# Patient Record
Sex: FEMALE | Race: WHITE | Employment: OTHER | URBAN - METROPOLITAN AREA
[De-identification: names, ages, dates, MRNs, and addresses within clinical notes are randomized per-mention and may not be internally consistent; named-entity substitution may affect disease eponyms.]

---

## 2017-05-02 ENCOUNTER — HOSPITAL ENCOUNTER (EMERGENCY)
Age: 82
Discharge: HOME | End: 2017-05-02
Attending: EMERGENCY MEDICINE
Payer: MEDICARE

## 2017-05-02 VITALS
RESPIRATION RATE: 20 BRPM | WEIGHT: 180 LBS | OXYGEN SATURATION: 98 % | SYSTOLIC BLOOD PRESSURE: 190 MMHG | BODY MASS INDEX: 29.99 KG/M2 | TEMPERATURE: 98.4 F | HEIGHT: 65 IN | HEART RATE: 70 BPM | DIASTOLIC BLOOD PRESSURE: 78 MMHG

## 2017-05-02 DIAGNOSIS — K94.20 COMPLICATION OF FEEDING TUBE (HCC): Primary | ICD-10-CM

## 2017-05-02 PROCEDURE — 77030005103 HC CATH GASTMY BLN HALY -B

## 2017-05-02 PROCEDURE — 99283 EMERGENCY DEPT VISIT LOW MDM: CPT | Performed by: NURSE PRACTITIONER

## 2017-05-02 NOTE — ED NOTES
Pt and pt daughter given discharge instructions. All questions answered, verbalizes understanding.  esign unavailable, see paper chart

## 2017-05-02 NOTE — ED PROVIDER NOTES
HPI Comments: Patient presents after her PEG tube fell out. Patient states her PEG tube fell out approx 1 hour prior to arrival to ED. Patient states she did not know her PEG tube had fallen out. She states her granddaughter noticed the PEG tube lying in her lap while she was lying in her recliner. Patient states uses her PEG tube for all feedings due to swallowing causing her to have nausea and weight loss. She denies abdominal pain at this time.         Past Medical History:   Diagnosis Date    BMI 29.0-29.9,adult     BMI 29    Brain tumor (benign) [facility])     Tumor resection @ [facility] 8/2015    Chronic renal disease 7/10/2015    RN @ [facility], denies    Current use of long term anticoagulation     Heparin 2500 Units BID    GERD (gastroesophageal reflux disease)     History of anorexia nervosa     History of DVT (deep vein thrombosis)     History of skin cancer     to face    Hypertension     Hypertensive encephalopathy syndrome 1/16/2014    resolved    Long term current use of systemic steroids     Pt currently taking Hydrocortisone    Nausea & vomiting 7/10/2015    Osteoarthritis of left knee 5/15/2009    Transient ischemic attack (TIA) 1/16/2014    RN @ [facility], denies    UTI (lower urinary tract infection) 10/11/2015    RN @ [facility], denies       Past Surgical History:   Procedure Laterality Date    HX HYSTERECTOMY      HX KNEE REPLACEMENT Right     HX OTHER SURGICAL      SKIN  CANCER REMOVED    HX OTHER SURGICAL      crainitomy with tumor resection         Family History:   Problem Relation Age of Onset    Malignant Hyperthermia Neg Hx     Pseudocholinesterase Deficiency Neg Hx     Delayed Awakening Neg Hx     Post-op Nausea/Vomiting Neg Hx     Emergence Delirium Neg Hx     Post-op Cognitive Dysfunction Neg Hx     Other Neg Hx        Social History     Social History    Marital status:      Spouse name: N/A    Number of children: N/A    Years of education: N/A Occupational History    Not on file. Social History Main Topics    Smoking status: Never Smoker    Smokeless tobacco: Never Used    Alcohol use No    Drug use: No    Sexual activity: Not on file     Other Topics Concern    Not on file     Social History Narrative         ALLERGIES: Ativan [lorazepam]; Contrast agent [iodine]; Hydralazine; Oxycodone; Prednisone; and Zofran odt [ondansetron]    Review of Systems   Constitutional: Negative for chills and fever. Respiratory: Negative for cough and shortness of breath. Gastrointestinal: Negative for abdominal pain, nausea and vomiting. Neurological: Negative for dizziness, weakness and numbness. Visit Vitals    /78 (BP 1 Location: Right arm, BP Patient Position: At rest)    Pulse 70    Temp 98.4 °F (36.9 °C)    Resp 20    Ht 5' 5\" (1.651 m)    Wt 81.6 kg (180 lb)    SpO2 98%    BMI 29.95 kg/m2              Physical Exam   Constitutional: She is oriented to person, place, and time. She appears well-developed and well-nourished. No distress. Cardiovascular: Normal rate and regular rhythm. Pulmonary/Chest: Effort normal and breath sounds normal. No respiratory distress. She has no wheezes. Abdominal: Soft. Bowel sounds are normal. She exhibits no distension. There is no tenderness. PEG tube site in epigastric area. Musculoskeletal: Normal range of motion. Neurological: She is alert and oriented to person, place, and time. Skin: Skin is warm and dry. She is not diaphoretic. No pallor. Psychiatric: She has a normal mood and affect. Her behavior is normal.   Nursing note and vitals reviewed. 5:50PM-attempted to pass PEG tube for placement. Unable to replace at this time. Discussed patient with Dr. Ori Cramer. He attempted to replace PEG but was unable at this time. 6:15PM-discussed patient with Dr. Jonas Dunlap with GI. Patient is to call GI office in the morning for an appointment to have PEG tube replaced tomorrow. MDM  Number of Diagnoses or Management Options  Complication of feeding tube [facility]): new and requires workup  Diagnosis management comments: PEG tube replacement attempted x2 without success. Spoke with Dr. Neyda Benton with GI patient is to call his office in the morning for an appointment tomorrow to have PEG tube replaced.      Patient Progress  Patient progress: stable    ED Course       Procedures

## 2018-12-04 ENCOUNTER — APPOINTMENT (OUTPATIENT)
Dept: GENERAL RADIOLOGY | Age: 83
End: 2018-12-04
Attending: EMERGENCY MEDICINE
Payer: MEDICARE

## 2018-12-04 ENCOUNTER — HOSPITAL ENCOUNTER (EMERGENCY)
Age: 83
Discharge: HOME | End: 2018-12-04
Attending: EMERGENCY MEDICINE
Payer: MEDICARE

## 2018-12-04 VITALS
OXYGEN SATURATION: 100 % | HEIGHT: 65 IN | RESPIRATION RATE: 16 BRPM | WEIGHT: 180 LBS | HEART RATE: 77 BPM | BODY MASS INDEX: 29.99 KG/M2 | TEMPERATURE: 98.6 F | SYSTOLIC BLOOD PRESSURE: 190 MMHG | DIASTOLIC BLOOD PRESSURE: 89 MMHG

## 2018-12-04 DIAGNOSIS — K94.23 PEG TUBE MALFUNCTION (HCC): Primary | ICD-10-CM

## 2018-12-04 PROCEDURE — 99283 EMERGENCY DEPT VISIT LOW MDM: CPT | Performed by: EMERGENCY MEDICINE

## 2018-12-04 PROCEDURE — 77030005103 HC CATH GASTMY BLN HALY -B

## 2018-12-04 PROCEDURE — 74018 RADEX ABDOMEN 1 VIEW: CPT

## 2018-12-04 PROCEDURE — 74011636320 HC RX REV CODE- 636/320: Performed by: EMERGENCY MEDICINE

## 2018-12-04 PROCEDURE — 75810000109 HC GASTRO TUBE CHANGE: Performed by: EMERGENCY MEDICINE

## 2018-12-04 RX ADMIN — DIATRIZOATE MEGLUMINE AND DIATRIZOATE SODIUM 30 ML: 660; 100 LIQUID ORAL; RECTAL at 09:26

## 2018-12-04 NOTE — PROGRESS NOTES
I have reviewed discharge instructions with the patient. The patient verbalized understanding. Patient left ED via Discharge Method: ambulatory to Home with family    Opportunity for questions and clarification provided. Patient given 0 scripts. To continue your aftercare when you leave the hospital, you may receive an automated call from our care team to check in on how you are doing. This is a free service and part of our promise to provide the best care and service to meet your aftercare needs.  If you have questions, or wish to unsubscribe from this service please call [phone]. Thank you for Choosing our [facility] Emergency Department.

## 2018-12-04 NOTE — ED PROVIDER NOTES
Over one year. She has had a PEG tube. This was a difficult placement and initially had attempts at 2 facilities in [place], only to be placed at [facility]. She has had no problems with this. She has significant baseline food disinterest in this is used to supplement and not due to difficulty swallowing. Denies had some lower abdominal discomfort for about 3 or 4 weeks. This is not a daily event. No significant present discomfort there. Contrast allergy is to IV contrast.  Family had the urge that we do oral contrast to confirm PEG tube, which was already planned      The history is provided by the patient and a relative. Feeding Tube Problem    This is a new problem. The current episode started 6 to 12 hours ago. The problem occurs constantly. The problem has not changed since onset. The patient is experiencing no pain. Pertinent negatives include no fever.         Past Medical History:   Diagnosis Date    BMI 29.0-29.9,adult     BMI 34    Brain tumor (benign) [facility])     Tumor resection @ [facility] 8/2015    Chronic renal disease 7/10/2015    RN @ [facility], denies    Current use of long term anticoagulation     Heparin 2500 Units BID    GERD (gastroesophageal reflux disease)     History of anorexia nervosa     History of DVT (deep vein thrombosis)     History of skin cancer     to face    Hypertension     Hypertensive encephalopathy syndrome 1/16/2014    resolved    Long term current use of systemic steroids     Pt currently taking Hydrocortisone    Nausea & vomiting 7/10/2015    Osteoarthritis of left knee 5/15/2009    Transient ischemic attack (TIA) 1/16/2014    RN @ [facility], denies    UTI (lower urinary tract infection) 10/11/2015    RN @ [facility], denies       Past Surgical History:   Procedure Laterality Date    HX HYSTERECTOMY      HX KNEE REPLACEMENT Right     HX OTHER SURGICAL      SKIN  CANCER REMOVED    HX OTHER SURGICAL      crainitomy with tumor resection Family History:   Problem Relation Age of Onset    Malignant Hyperthermia Neg Hx     Pseudocholinesterase Deficiency Neg Hx     Delayed Awakening Neg Hx     Post-op Nausea/Vomiting Neg Hx     Emergence Delirium Neg Hx     Post-op Cognitive Dysfunction Neg Hx     Other Neg Hx        Social History     Socioeconomic History    Marital status:      Spouse name: Not on file    Number of children: Not on file    Years of education: Not on file    Highest education level: Not on file   Social Needs    Financial resource strain: Not on file    Food insecurity - worry: Not on file    Food insecurity - inability: Not on file   [employer] needs - medical: Not on file   [email or web address] needs - non-medical: Not on file   Occupational History    Not on file   Tobacco Use    Smoking status: Never Smoker    Smokeless tobacco: Never Used   Substance and Sexual Activity    Alcohol use: No     Alcohol/week: 0.0 oz    Drug use: No    Sexual activity: Not on file   Other Topics Concern    Not on file   Social History Narrative    Not on file         ALLERGIES: Ativan [lorazepam]; Contrast agent [iodine]; Hydralazine; Oxycodone; Prednisone; and Zofran odt [ondansetron]    Review of Systems   Constitutional: Negative for chills and fever. Cardiovascular: Negative. Gastrointestinal: Negative. Genitourinary: Negative. All other systems reviewed and are negative. Vitals:    12/04/18 0813 12/04/18 0818   BP: (!) 241/107 (!) 201/82   Pulse: 78 69   Resp: 17    Temp: 98.2 °F (36.8 °C)    SpO2: 97% 97%   Weight: 81.6 kg (180 lb)    Height: 5' 5\" (1.651 m)             Physical Exam   Constitutional: She appears well-developed and well-nourished. HENT:   Head: Atraumatic. Cardiovascular: Normal rate. Pulmonary/Chest: Effort normal.   Abdominal: Soft. Neurological: She is alert. Skin: Skin is warm. Psychiatric: She has a normal mood and affect.    Nursing note and vitals reviewed. MDM  Number of Diagnoses or Management Options  PEG tube malfunction [facility]):   Diagnosis management comments: PEG tube fell out / replaced with minimal difficulty and x-ray confirms correct location       Amount and/or Complexity of Data Reviewed  Tests in the radiology section of CPT®: reviewed and ordered  Obtain history from someone other than the patient: yes    Risk of Complications, Morbidity, and/or Mortality  Presenting problems: moderate  Diagnostic procedures: low  Management options: moderate  General comments: wwith recurrence of abdominal pain. Should recheck    Patient Progress  Patient progress: improved         Other Procedure  Date/Time: 12/4/2018 9:41 AM  Performed by: Linda Dias MD  Authorized by: Linda Dias MD     Consent:     Consent obtained:  Verbal    Consent given by:  Patient  Anesthesia (see MAR for exact dosages): Anesthesia method:  None  Comments: The only cleansed around site. Area is lubricated with  Sterile K-Lyte type jelly. A #18 [place] tube is replaced. Consistent 1. The one removed(had fallen out). Gradual gentle pressure was used to introduce. Radiograph confirms correct placement. Flange is adjusted and gauze placed between flange and skin.

## 2020-01-26 ENCOUNTER — HOSPITAL ENCOUNTER (EMERGENCY)
Age: 85
Discharge: HOME | End: 2020-01-26
Attending: EMERGENCY MEDICINE
Payer: MEDICARE

## 2020-01-26 ENCOUNTER — APPOINTMENT (OUTPATIENT)
Dept: GENERAL RADIOLOGY | Age: 85
End: 2020-01-26
Attending: EMERGENCY MEDICINE
Payer: MEDICARE

## 2020-01-26 VITALS
HEIGHT: 65 IN | BODY MASS INDEX: 29.99 KG/M2 | DIASTOLIC BLOOD PRESSURE: 76 MMHG | RESPIRATION RATE: 16 BRPM | OXYGEN SATURATION: 96 % | TEMPERATURE: 97.3 F | HEART RATE: 72 BPM | SYSTOLIC BLOOD PRESSURE: 183 MMHG | WEIGHT: 180 LBS

## 2020-01-26 DIAGNOSIS — Z46.59 ENCOUNTER FOR FEEDING TUBE PLACEMENT: Primary | ICD-10-CM

## 2020-01-26 PROCEDURE — 74011636320 HC RX REV CODE- 636/320: Performed by: EMERGENCY MEDICINE

## 2020-01-26 PROCEDURE — 99283 EMERGENCY DEPT VISIT LOW MDM: CPT

## 2020-01-26 PROCEDURE — 75810000109 HC GASTRO TUBE CHANGE

## 2020-01-26 PROCEDURE — 74018 RADEX ABDOMEN 1 VIEW: CPT

## 2020-01-26 PROCEDURE — 77030005103 HC CATH GASTMY BLN HALY -B

## 2020-01-26 RX ADMIN — DIATRIZOATE MEGLUMINE AND DIATRIZOATE SODIUM 30 ML: 660; 100 LIQUID ORAL; RECTAL at 12:44

## 2020-01-26 NOTE — ED TRIAGE NOTES
Patient reports feeding tube fell out around 2 hours ago. Denies pain to site.  Patient family brought tube to ED for size reference

## 2020-01-26 NOTE — ED NOTES
I have reviewed discharge instructions with the patient and daughter. The patient and daughter verbalized understanding. Patient left ED via Discharge Method: wheelchair to Home with daughter. Opportunity for questions and clarification provided. Patient given 0 scripts. No e-sign. To continue your aftercare when you leave the hospital, you may receive an automated call from our care team to check in on how you are doing. This is a free service and part of our promise to provide the best care and service to meet your aftercare needs.  If you have questions, or wish to unsubscribe from this service please call [phone]. Thank you for Choosing our [facility] Emergency Department.

## 2020-01-26 NOTE — ED PROVIDER NOTES
Eating tube fell out about 2 to 3 hours ago. Used to supplement patient's nutrition. Feeding tube has been present for 4 years. 18 Chinese feeding tube in a plastic bag on the counter. The history is provided by a relative. Feeding Tube Problem    This is a new problem. The current episode started 1 to 2 hours ago. The problem has not changed since onset. Pertinent negatives include no fever.         Past Medical History:   Diagnosis Date    BMI 29.0-29.9,adult     BMI 34    Brain tumor (benign) [facility])     Tumor resection @ [facility] 8/2015    Chronic renal disease 7/10/2015    RN @ [facility], denies    Current use of long term anticoagulation     Heparin 2500 Units BID    GERD (gastroesophageal reflux disease)     History of anorexia nervosa     History of DVT (deep vein thrombosis)     History of skin cancer     to face    Hypertension     Hypertensive encephalopathy syndrome 1/16/2014    resolved    Long term current use of systemic steroids     Pt currently taking Hydrocortisone    Nausea & vomiting 7/10/2015    Osteoarthritis of left knee 5/15/2009    Transient ischemic attack (TIA) 1/16/2014    RN @ [facility], denies    UTI (lower urinary tract infection) 10/11/2015    RN @ [facility], denies       Past Surgical History:   Procedure Laterality Date    HX HYSTERECTOMY      HX KNEE REPLACEMENT Right     HX OTHER SURGICAL      SKIN  CANCER REMOVED    HX OTHER SURGICAL      crainitomy with tumor resection         Family History:   Problem Relation Age of Onset    Malignant Hyperthermia Neg Hx     Pseudocholinesterase Deficiency Neg Hx     Delayed Awakening Neg Hx     Post-op Nausea/Vomiting Neg Hx     Emergence Delirium Neg Hx     Post-op Cognitive Dysfunction Neg Hx     Other Neg Hx        Social History     Socioeconomic History    Marital status:      Spouse name: Not on file    Number of children: Not on file    Years of education: Not on file    Highest education level: Not on file   Occupational History    Not on file   Social Needs    Financial resource strain: Not on file    Food insecurity:     Worry: Not on file     Inability: Not on file    Transportation needs:     Medical: Not on file     Non-medical: Not on file   Tobacco Use    Smoking status: Never Smoker    Smokeless tobacco: Never Used   Substance and Sexual Activity    Alcohol use: No     Alcohol/week: 0.0 standard drinks    Drug use: No    Sexual activity: Not on file   Lifestyle    Physical activity:     Days per week: Not on file     Minutes per session: Not on file    Stress: Not on file   Relationships    Social connections:     Talks on phone: Not on file     Gets together: Not on file     Attends Congregation service: Not on file     Active member of club or organization: Not on file     Attends meetings of clubs or organizations: Not on file     Relationship status: Not on file    Intimate partner violence:     Fear of current or ex partner: Not on file     Emotionally abused: Not on file     Physically abused: Not on file     Forced sexual activity: Not on file   Other Topics Concern    Not on file   Social History Narrative    Not on file         ALLERGIES: Ativan [lorazepam]; Contrast agent [iodine]; Hydralazine; Oxycodone; Prednisone; and Zofran odt [ondansetron]    Review of Systems   Constitutional: Negative for fever. Gastrointestinal: Negative for abdominal pain. Skin: Negative for color change and wound. Vitals:    01/26/20 1035   BP: 190/81   Pulse: 67   Resp: 16   Temp: 97.5 °F (36.4 °C)   SpO2: 96%   Weight: 81.6 kg (180 lb)   Height: 5' 5\" (1.651 m)            Physical Exam  Nursing note reviewed. Constitutional:       Appearance: Normal appearance. Cardiovascular:      Rate and Rhythm: Normal rate and regular rhythm. Heart sounds: Normal heart sounds. Pulmonary:      Effort: Pulmonary effort is normal.      Breath sounds: Normal breath sounds. Abdominal:      General: There is no distension. Palpations: Abdomen is soft. Tenderness: There is no tenderness. Comments: PEG tube site is quite high in the abdomen just left of midline. No drainage warmth erythema or purulent discharge noted. The area is nontender. Neurological:      Mental Status: She is alert. MDM  Number of Diagnoses or Management Options  Diagnosis management comments: I will change PEG tube out. A good tract is surely formed over the last 4 years. Patient wanting an x-ray to confirm placement but I do not believe this will be necessary if I can do this without difficulty and get gastric gastric contents returned. 12:43 PM  kub confirms placement       Amount and/or Complexity of Data Reviewed  Tests in the radiology section of CPT®: ordered and reviewed (KUB with Gastrografin confirms proper placement and there is contrast in the stomach and small intestine.)           Other Procedure  Date/Time: 1/26/2020 12:00 PM  Performed by: Pj Carr MD  Authorized by: Pj Carr MD     Consent:     Consent obtained:  Verbal    Consent given by:  Patient and guardian    Risks discussed:  Bleeding and pain  Pre-procedure details:     Skin preparation:  ChloraPrep  Anesthesia (see MAR for exact dosages): Anesthesia method:  None  Post-procedure details:     Patient tolerance of procedure: Tolerated well, no immediate complications  Comments:      Difficulty placing a replacement 18 [place] G-tube. Initially it would not pass. Hemostats were used to slightly dilate the opening with a small amount of bleeding that was easily controlled. I was then able to pass the tube into the stomach with return of gastric contents. Due to the difficulty we will obtain a KUB with Gastrografin to confirm placement. Patient tolerated procedure well. Dressing applied by RN.